# Patient Record
Sex: MALE | Race: WHITE | NOT HISPANIC OR LATINO | URBAN - METROPOLITAN AREA
[De-identification: names, ages, dates, MRNs, and addresses within clinical notes are randomized per-mention and may not be internally consistent; named-entity substitution may affect disease eponyms.]

---

## 2023-10-24 RX ORDER — AMLODIPINE BESYLATE 5 MG/1
5 TABLET ORAL DAILY
Qty: 90 TABLET | Refills: 3 | Status: CANCELLED | OUTPATIENT
Start: 2023-10-24 | End: 2024-10-22

## 2023-10-24 RX ORDER — ATORVASTATIN CALCIUM 80 MG/1
80 TABLET, FILM COATED ORAL NIGHTLY
Qty: 90 TABLET | Refills: 3 | Status: CANCELLED | OUTPATIENT
Start: 2023-10-24 | End: 2024-10-22

## 2023-10-24 RX ORDER — ASPIRIN 81 MG/1
81 TABLET ORAL DAILY
Qty: 90 TABLET | Refills: 3 | Status: CANCELLED | OUTPATIENT
Start: 2023-10-24 | End: 2024-10-22

## 2023-10-24 RX ORDER — METOPROLOL TARTRATE 25 MG/1
25 TABLET, FILM COATED ORAL 2 TIMES DAILY
Qty: 180 TABLET | Refills: 3 | Status: CANCELLED | OUTPATIENT
Start: 2023-10-24 | End: 2024-10-22

## 2023-10-25 RX ORDER — ASPIRIN 81 MG/1
81 TABLET ORAL DAILY
Qty: 90 TABLET | Refills: 3 | Status: CANCELLED | OUTPATIENT
Start: 2023-10-24 | End: 2024-10-22

## 2023-10-25 RX ORDER — AMLODIPINE BESYLATE 5 MG/1
5 TABLET ORAL DAILY
Qty: 90 TABLET | Refills: 3 | Status: CANCELLED | OUTPATIENT
Start: 2023-10-24 | End: 2024-10-22

## 2023-10-25 RX ORDER — METOPROLOL TARTRATE 25 MG/1
25 TABLET, FILM COATED ORAL 2 TIMES DAILY
Qty: 180 TABLET | Refills: 3 | Status: CANCELLED | OUTPATIENT
Start: 2023-10-24 | End: 2024-10-22

## 2023-10-25 RX ORDER — ATORVASTATIN CALCIUM 80 MG/1
80 TABLET, FILM COATED ORAL NIGHTLY
Qty: 90 TABLET | Refills: 3 | Status: CANCELLED | OUTPATIENT
Start: 2023-10-24 | End: 2024-10-22

## 2023-10-30 RX ORDER — METOPROLOL TARTRATE 25 MG/1
25 TABLET, FILM COATED ORAL 2 TIMES DAILY
Qty: 180 TABLET | Refills: 3 | Status: CANCELLED | OUTPATIENT
Start: 2023-10-24 | End: 2024-10-22

## 2023-10-30 RX ORDER — ASPIRIN 81 MG/1
81 TABLET ORAL DAILY
Qty: 90 TABLET | Refills: 3 | Status: CANCELLED | OUTPATIENT
Start: 2023-10-24 | End: 2024-10-22

## 2023-10-30 RX ORDER — AMLODIPINE BESYLATE 5 MG/1
5 TABLET ORAL DAILY
Qty: 90 TABLET | Refills: 3 | Status: CANCELLED | OUTPATIENT
Start: 2023-10-24 | End: 2024-10-22

## 2023-10-30 RX ORDER — ATORVASTATIN CALCIUM 80 MG/1
80 TABLET, FILM COATED ORAL NIGHTLY
Qty: 90 TABLET | Refills: 3 | Status: CANCELLED | OUTPATIENT
Start: 2023-10-24 | End: 2024-10-22

## 2023-11-03 ENCOUNTER — PHARMACY VISIT (OUTPATIENT)
Dept: PHARMACY | Facility: CLINIC | Age: 48
End: 2023-11-03

## 2023-11-03 DIAGNOSIS — I10 HYPERTENSION, UNSPECIFIED TYPE: ICD-10-CM

## 2023-11-03 DIAGNOSIS — E78.5 HYPERLIPIDEMIA, UNSPECIFIED HYPERLIPIDEMIA TYPE: ICD-10-CM

## 2023-11-03 DIAGNOSIS — I25.10 CORONARY ARTERY DISEASE INVOLVING NATIVE CORONARY ARTERY OF NATIVE HEART, UNSPECIFIED WHETHER ANGINA PRESENT: Primary | ICD-10-CM

## 2023-11-03 RX ORDER — METOPROLOL TARTRATE 25 MG/1
25 TABLET, FILM COATED ORAL 2 TIMES DAILY
Qty: 180 TABLET | Refills: 0 | Status: SHIPPED | OUTPATIENT
Start: 2023-11-03 | End: 2023-11-28 | Stop reason: SDUPTHER

## 2023-11-03 RX ORDER — ASPIRIN 81 MG/1
81 TABLET ORAL DAILY
Qty: 90 TABLET | Refills: 0 | Status: SHIPPED | OUTPATIENT
Start: 2023-11-03 | End: 2023-11-28 | Stop reason: SDUPTHER

## 2023-11-03 RX ORDER — ATORVASTATIN CALCIUM 80 MG/1
80 TABLET, FILM COATED ORAL NIGHTLY
Qty: 90 TABLET | Refills: 0 | Status: SHIPPED | OUTPATIENT
Start: 2023-11-03 | End: 2023-11-28 | Stop reason: WASHOUT

## 2023-11-03 RX ORDER — AMLODIPINE BESYLATE 5 MG/1
5 TABLET ORAL DAILY
Qty: 90 TABLET | Refills: 0 | Status: SHIPPED | OUTPATIENT
Start: 2023-11-03 | End: 2023-11-28 | Stop reason: SDUPTHER

## 2023-11-28 ENCOUNTER — PHARMACY VISIT (OUTPATIENT)
Dept: PHARMACY | Facility: CLINIC | Age: 48
End: 2023-11-28

## 2023-11-28 ENCOUNTER — OFFICE VISIT (OUTPATIENT)
Dept: CARDIOLOGY | Facility: CLINIC | Age: 48
End: 2023-11-28

## 2023-11-28 ENCOUNTER — APPOINTMENT (OUTPATIENT)
Dept: CARDIOLOGY | Facility: CLINIC | Age: 48
End: 2023-11-28
Payer: COMMERCIAL

## 2023-11-28 VITALS
BODY MASS INDEX: 26.98 KG/M2 | DIASTOLIC BLOOD PRESSURE: 92 MMHG | HEIGHT: 68 IN | SYSTOLIC BLOOD PRESSURE: 146 MMHG | WEIGHT: 178 LBS | HEART RATE: 62 BPM

## 2023-11-28 DIAGNOSIS — I10 HYPERTENSION, UNSPECIFIED TYPE: ICD-10-CM

## 2023-11-28 DIAGNOSIS — I25.10 CORONARY ARTERY DISEASE INVOLVING NATIVE CORONARY ARTERY OF NATIVE HEART, UNSPECIFIED WHETHER ANGINA PRESENT: ICD-10-CM

## 2023-11-28 DIAGNOSIS — I25.10 CORONARY ARTERY DISEASE INVOLVING NATIVE CORONARY ARTERY OF NATIVE HEART WITHOUT ANGINA PECTORIS: ICD-10-CM

## 2023-11-28 DIAGNOSIS — Z98.61 POST PTCA: Primary | ICD-10-CM

## 2023-11-28 DIAGNOSIS — E78.5 HYPERLIPIDEMIA, UNSPECIFIED HYPERLIPIDEMIA TYPE: ICD-10-CM

## 2023-11-28 DIAGNOSIS — I10 PRIMARY HYPERTENSION: ICD-10-CM

## 2023-11-28 PROBLEM — R10.13 DYSPEPSIA: Status: ACTIVE | Noted: 2023-11-28

## 2023-11-28 PROBLEM — I51.7 MILD CONCENTRIC LEFT VENTRICULAR HYPERTROPHY (LVH): Status: ACTIVE | Noted: 2023-11-28

## 2023-11-28 PROBLEM — R00.2 PALPITATIONS: Status: ACTIVE | Noted: 2023-11-28

## 2023-11-28 PROCEDURE — 93000 ELECTROCARDIOGRAM COMPLETE: CPT | Performed by: INTERNAL MEDICINE

## 2023-11-28 PROCEDURE — 1036F TOBACCO NON-USER: CPT | Performed by: INTERNAL MEDICINE

## 2023-11-28 PROCEDURE — 99214 OFFICE O/P EST MOD 30 MIN: CPT | Performed by: INTERNAL MEDICINE

## 2023-11-28 PROCEDURE — RXMED WILLOW AMBULATORY MEDICATION CHARGE

## 2023-11-28 PROCEDURE — 3080F DIAST BP >= 90 MM HG: CPT | Performed by: INTERNAL MEDICINE

## 2023-11-28 PROCEDURE — 3077F SYST BP >= 140 MM HG: CPT | Performed by: INTERNAL MEDICINE

## 2023-11-28 RX ORDER — ROSUVASTATIN CALCIUM 40 MG/1
40 TABLET, COATED ORAL DAILY
Qty: 90 TABLET | Refills: 3 | Status: SHIPPED | OUTPATIENT
Start: 2023-11-28 | End: 2024-11-27

## 2023-11-28 RX ORDER — AMLODIPINE BESYLATE 5 MG/1
5 TABLET ORAL DAILY
Qty: 90 TABLET | Refills: 0 | Status: SHIPPED | OUTPATIENT
Start: 2023-11-28 | End: 2024-03-04 | Stop reason: SDUPTHER

## 2023-11-28 RX ORDER — METOPROLOL TARTRATE 25 MG/1
25 TABLET, FILM COATED ORAL 2 TIMES DAILY
Qty: 180 TABLET | Refills: 0 | Status: SHIPPED | OUTPATIENT
Start: 2023-11-28 | End: 2024-03-04 | Stop reason: SDUPTHER

## 2023-11-28 RX ORDER — HYDROGEN PEROXIDE 3 %
20 SOLUTION, NON-ORAL MISCELLANEOUS
COMMUNITY

## 2023-11-28 RX ORDER — ASPIRIN 81 MG/1
81 TABLET ORAL DAILY
Qty: 90 TABLET | Refills: 0 | Status: SHIPPED | OUTPATIENT
Start: 2023-11-28 | End: 2024-03-04 | Stop reason: SDUPTHER

## 2023-11-28 RX ORDER — DICLOFENAC SODIUM 75 MG/1
75 TABLET, DELAYED RELEASE ORAL 2 TIMES DAILY
COMMUNITY
Start: 2006-07-21

## 2023-11-28 ASSESSMENT — ENCOUNTER SYMPTOMS
DEPRESSION: 0
LOSS OF SENSATION IN FEET: 0
OCCASIONAL FEELINGS OF UNSTEADINESS: 0

## 2023-11-28 NOTE — ASSESSMENT & PLAN NOTE
patient may have been dehydrated at that time. He does not have asymmetric LVH. Nothing on repeat echo.

## 2023-11-28 NOTE — ASSESSMENT & PLAN NOTE
history of unstable angina, status post PCI to LAD. Now symptom-free. Continue aspirin statins and beta-blockers.

## 2023-11-28 NOTE — PROGRESS NOTES
"Chief Complaint:   Annual Exam (Refills/)     History Of Present Illness:    Kalpesh Montoya is a 47 y.o. male who presented to the hospital in September 2020 with unstable angina type of symptoms. He underwent a CT angiogram which showed a high risk plaque in proximal and mid LAD. He underwent cardiac catheterization and mid LAD lesion was significant by FFR, he underwent PCI.   He is here for follow-up. No recurrence of unstable angina type of symptoms.   No chest pain with activities, no palpitations, lightheadedness or loss of consciousness. Gets rare palpitations that are transient at night.     EKG today shows sinus rhythm. Blood pressure is better controlled, however, he ran out of his medications about a month ago.  His last echo showed mild LVH, there was some LVOT obstruction. There was an echo from 2021 that showed essentially normal heart.     Last Recorded Vitals:  Vitals:    11/28/23 1505   BP: (!) 146/92   BP Location: Left arm   Pulse: 62   Weight: 80.7 kg (178 lb)   Height: 1.727 m (5' 8\")       Past Medical History:  He has no past medical history on file.    Past Surgical History:  He has a past surgical history that includes Other surgical history (09/15/2020); Other surgical history (09/22/2020); Other surgical history (09/22/2020); and CT angio coronary art with heartflow if score >30% (9/9/2020).      Social History:  He reports that he has never smoked. He has never used smokeless tobacco. He reports that he does not drink alcohol and does not use drugs.    Family History:  No family history on file.     Allergies:  Penicillins    Outpatient Medications:  Current Outpatient Medications   Medication Instructions    amLODIPine (NORVASC) 5 mg, oral, Daily    aspirin 81 mg, oral, Daily    atorvastatin (LIPITOR) 80 mg, oral, Nightly    diclofenac (VOLTAREN) 75 mg, oral, 2 times daily    esomeprazole (NEXIUM) 20 mg, oral, Daily before breakfast    metoprolol tartrate (LOPRESSOR) 25 mg, oral, 2 times " daily       Physical Exam:  Physical Exam  Vitals reviewed.   Constitutional:       Appearance: Normal appearance.   Neck:      Vascular: No carotid bruit or JVD.   Cardiovascular:      Rate and Rhythm: Normal rate and regular rhythm.      Heart sounds: Normal heart sounds, S1 normal and S2 normal. No murmur heard.  Pulmonary:      Effort: Pulmonary effort is normal.      Breath sounds: Normal breath sounds.   Abdominal:      General: Abdomen is flat. Bowel sounds are normal.      Palpations: Abdomen is soft.   Musculoskeletal:      Right lower leg: No edema.      Left lower leg: No edema.   Skin:     General: Skin is warm.   Neurological:      Mental Status: He is alert. Mental status is at baseline.   Psychiatric:         Mood and Affect: Mood normal.         Behavior: Behavior normal.           Last Labs:  CBC -  Lab Results   Component Value Date    WBC 5.7 10/15/2022    HGB 14.5 10/15/2022    HCT 41.3 10/15/2022    MCV 95 10/15/2022     10/15/2022       CMP -  Lab Results   Component Value Date    CALCIUM 8.6 10/15/2022    PROT 6.6 09/09/2020    ALBUMIN 4.2 09/09/2020    AST 19 09/09/2020    ALT 23 09/09/2020    ALKPHOS 25 (L) 09/09/2020    BILITOT 0.7 09/09/2020       LIPID PANEL -   Lab Results   Component Value Date    CHOL 133 10/15/2022    TRIG 91 10/15/2022    HDL 33.2 (A) 10/15/2022    CHHDL 4.0 10/15/2022    LDLF 82 10/15/2022    VLDL 18 10/15/2022       RENAL FUNCTION PANEL -   Lab Results   Component Value Date    GLUCOSE 106 (H) 10/15/2022     10/15/2022    K 4.0 10/15/2022     10/15/2022    CO2 28 10/15/2022    ANIONGAP 11 10/15/2022    BUN 14 10/15/2022    CREATININE 1.14 10/15/2022    GFRMALE 79 10/15/2022    CALCIUM 8.6 10/15/2022    ALBUMIN 4.2 09/09/2020        Lab Results   Component Value Date    BNP 11 09/23/2020    HGBA1C 5.6 09/10/2020       Last Cardiology Tests:      Assessment/Plan     In summary Mr. Montoya is a 46-year-old gentleman with coronary artery disease,  hypertension, hyperlipidemia.     1-coronary artery disease-history of unstable angina, status post PCI to LAD. Now symptom-free. Continue aspirin statins and beta-blockers.      0-satmeqcvmqjwmq-MNR was 80 target to lower below 70 if possible.  Switch to Crestor 40 mg repeat lipid profile in 3 months..     3-hypertension-he states his blood pressure is generally well-controlled however he ran out of his medications a month ago.  We will refill. Home/self monitoring of BP encouraged. Goal BP discussed. Lifestyle advise given.       4-mild LVH with some LVOT obstruction- patient may have been dehydrated at that time. He does not have asymmetric LVH. Nothing on repeat echo.     Advised to establish with PCP.     Sahil Lyons MD

## 2023-11-28 NOTE — ASSESSMENT & PLAN NOTE
mild LVH with some LVOT obstruction- patient may have been dehydrated at that time. He does not have asymmetric LVH. Nothing on repeat echo.

## 2023-11-28 NOTE — PATIENT INSTRUCTIONS
Please check your blood pressure at home once a week and keep up record of your home blood pressure numbers.    Hypertension or High blood pressure is a condition that puts you at risk for heart attack, stroke, and kidney disease. It does not usually cause symptoms. But it can be serious.  Generally speaking, your target blood pressure is 130/80 or less. We encourage self-monitoring of blood pressure regularly at home, keeping a log and bringing it with you during doctors' visits.  You have a lot of control over your blood pressure. To lower it:  1) Lose weight (if you are overweight)  2)Choose a diet low in fat and rich in fruits, vegetables, and low-fat dairy products  3) Reduce the amount of salt you eat  4) Do something active for at least 30 minutes a day on most days of the week  5) Cut down on alcohol (if you drink more than 2 alcoholic drinks per day).  I (or your other doctors) may prescribe you medications to lower blood pressure. It is important that you do not stop these medications without speaking with us

## 2024-03-01 DIAGNOSIS — I25.10 CORONARY ARTERY DISEASE INVOLVING NATIVE CORONARY ARTERY OF NATIVE HEART, UNSPECIFIED WHETHER ANGINA PRESENT: ICD-10-CM

## 2024-03-01 DIAGNOSIS — I10 HYPERTENSION, UNSPECIFIED TYPE: ICD-10-CM

## 2024-03-01 PROCEDURE — RXMED WILLOW AMBULATORY MEDICATION CHARGE

## 2024-03-01 RX ORDER — AMLODIPINE BESYLATE 5 MG/1
5 TABLET ORAL DAILY
Qty: 90 TABLET | Refills: 0 | Status: CANCELLED | OUTPATIENT
Start: 2024-03-01 | End: 2024-05-30

## 2024-03-01 RX ORDER — METOPROLOL TARTRATE 25 MG/1
25 TABLET, FILM COATED ORAL 2 TIMES DAILY
Qty: 180 TABLET | Refills: 0 | Status: CANCELLED | OUTPATIENT
Start: 2024-03-01 | End: 2024-05-30

## 2024-03-01 RX ORDER — ASPIRIN 81 MG/1
81 TABLET ORAL DAILY
Qty: 90 TABLET | Refills: 0 | Status: CANCELLED | OUTPATIENT
Start: 2024-03-01 | End: 2024-05-30

## 2024-03-02 ENCOUNTER — PHARMACY VISIT (OUTPATIENT)
Dept: PHARMACY | Facility: CLINIC | Age: 49
End: 2024-03-02
Payer: COMMERCIAL

## 2024-03-02 PROCEDURE — RXMED WILLOW AMBULATORY MEDICATION CHARGE

## 2024-03-04 DIAGNOSIS — I25.10 CORONARY ARTERY DISEASE INVOLVING NATIVE CORONARY ARTERY OF NATIVE HEART, UNSPECIFIED WHETHER ANGINA PRESENT: ICD-10-CM

## 2024-03-04 DIAGNOSIS — I10 HYPERTENSION, UNSPECIFIED TYPE: ICD-10-CM

## 2024-03-04 PROCEDURE — RXMED WILLOW AMBULATORY MEDICATION CHARGE

## 2024-03-04 RX ORDER — METOPROLOL TARTRATE 25 MG/1
25 TABLET, FILM COATED ORAL 2 TIMES DAILY
Qty: 180 TABLET | Refills: 2 | Status: SHIPPED | OUTPATIENT
Start: 2024-03-04 | End: 2024-11-29

## 2024-03-04 RX ORDER — ASPIRIN 81 MG/1
81 TABLET ORAL DAILY
Qty: 90 TABLET | Refills: 0 | Status: SHIPPED | OUTPATIENT
Start: 2024-03-04 | End: 2024-05-31 | Stop reason: SDUPTHER

## 2024-03-04 RX ORDER — AMLODIPINE BESYLATE 5 MG/1
5 TABLET ORAL DAILY
Qty: 90 TABLET | Refills: 2 | Status: SHIPPED | OUTPATIENT
Start: 2024-03-04 | End: 2024-11-29

## 2024-03-05 ENCOUNTER — LAB (OUTPATIENT)
Dept: LAB | Facility: LAB | Age: 49
End: 2024-03-05
Payer: COMMERCIAL

## 2024-03-05 DIAGNOSIS — E78.5 HYPERLIPIDEMIA, UNSPECIFIED HYPERLIPIDEMIA TYPE: ICD-10-CM

## 2024-03-05 DIAGNOSIS — I10 PRIMARY HYPERTENSION: ICD-10-CM

## 2024-03-05 LAB
ANION GAP SERPL CALC-SCNC: 10 MMOL/L (ref 10–20)
BUN SERPL-MCNC: 11 MG/DL (ref 6–23)
CALCIUM SERPL-MCNC: 8.9 MG/DL (ref 8.6–10.3)
CHLORIDE SERPL-SCNC: 104 MMOL/L (ref 98–107)
CHOLEST SERPL-MCNC: 121 MG/DL (ref 0–199)
CHOLESTEROL/HDL RATIO: 3.6
CO2 SERPL-SCNC: 31 MMOL/L (ref 21–32)
CREAT SERPL-MCNC: 1.09 MG/DL (ref 0.5–1.3)
EGFRCR SERPLBLD CKD-EPI 2021: 83 ML/MIN/1.73M*2
ERYTHROCYTE [DISTWIDTH] IN BLOOD BY AUTOMATED COUNT: 12.5 % (ref 11.5–14.5)
GLUCOSE SERPL-MCNC: 115 MG/DL (ref 74–99)
HCT VFR BLD AUTO: 43.1 % (ref 41–52)
HDLC SERPL-MCNC: 33.5 MG/DL
HGB BLD-MCNC: 14.4 G/DL (ref 13.5–17.5)
LDLC SERPL CALC-MCNC: 68 MG/DL
MCH RBC QN AUTO: 32.5 PG (ref 26–34)
MCHC RBC AUTO-ENTMCNC: 33.4 G/DL (ref 32–36)
MCV RBC AUTO: 97 FL (ref 80–100)
NON HDL CHOLESTEROL: 88 MG/DL (ref 0–149)
NRBC BLD-RTO: 0 /100 WBCS (ref 0–0)
PLATELET # BLD AUTO: 165 X10*3/UL (ref 150–450)
POTASSIUM SERPL-SCNC: 3.9 MMOL/L (ref 3.5–5.3)
RBC # BLD AUTO: 4.43 X10*6/UL (ref 4.5–5.9)
SODIUM SERPL-SCNC: 141 MMOL/L (ref 136–145)
TRIGL SERPL-MCNC: 99 MG/DL (ref 0–149)
VLDL: 20 MG/DL (ref 0–40)
WBC # BLD AUTO: 5.7 X10*3/UL (ref 4.4–11.3)

## 2024-03-05 PROCEDURE — 36415 COLL VENOUS BLD VENIPUNCTURE: CPT

## 2024-03-05 PROCEDURE — 80048 BASIC METABOLIC PNL TOTAL CA: CPT

## 2024-03-05 PROCEDURE — 80061 LIPID PANEL: CPT

## 2024-03-05 PROCEDURE — 85027 COMPLETE CBC AUTOMATED: CPT

## 2024-03-06 ENCOUNTER — PHARMACY VISIT (OUTPATIENT)
Dept: PHARMACY | Facility: CLINIC | Age: 49
End: 2024-03-06
Payer: COMMERCIAL

## 2024-03-15 ENCOUNTER — TELEPHONE (OUTPATIENT)
Dept: CARDIOLOGY | Facility: CLINIC | Age: 49
End: 2024-03-15
Payer: COMMERCIAL

## 2024-03-15 NOTE — TELEPHONE ENCOUNTER
Dr. Lyons reviewed your lab results.  No changes to treatment plan at this time.  Follow up as scheduled.  Consider follow up with PCP for diabetes screening if this was fasting blood work.  Glucose was a little elevated for a fasting sample.    Attempted to call results/recommendations but no answer and mailbox is full.

## 2024-03-15 NOTE — TELEPHONE ENCOUNTER
----- Message from Sahil Lyons MD sent at 3/6/2024  2:06 PM EST -----  Results reviewed. No critical results, follow up as usual to discuss in details.

## 2024-05-31 ENCOUNTER — PHARMACY VISIT (OUTPATIENT)
Dept: PHARMACY | Facility: CLINIC | Age: 49
End: 2024-05-31
Payer: COMMERCIAL

## 2024-05-31 DIAGNOSIS — I25.10 CORONARY ARTERY DISEASE INVOLVING NATIVE CORONARY ARTERY OF NATIVE HEART, UNSPECIFIED WHETHER ANGINA PRESENT: ICD-10-CM

## 2024-05-31 PROCEDURE — RXMED WILLOW AMBULATORY MEDICATION CHARGE

## 2024-05-31 RX ORDER — ASPIRIN 81 MG/1
81 TABLET ORAL DAILY
Qty: 90 TABLET | Refills: 0 | Status: CANCELLED | OUTPATIENT
Start: 2024-05-31 | End: 2024-08-29

## 2024-06-01 DIAGNOSIS — I25.10 CORONARY ARTERY DISEASE INVOLVING NATIVE CORONARY ARTERY OF NATIVE HEART, UNSPECIFIED WHETHER ANGINA PRESENT: ICD-10-CM

## 2024-06-03 RX ORDER — ASPIRIN 81 MG/1
81 TABLET ORAL DAILY
Qty: 90 TABLET | Refills: 3 | Status: SHIPPED | OUTPATIENT
Start: 2024-06-03

## 2024-09-03 ENCOUNTER — PHARMACY VISIT (OUTPATIENT)
Dept: PHARMACY | Facility: CLINIC | Age: 49
End: 2024-09-03
Payer: COMMERCIAL

## 2024-09-03 PROCEDURE — RXMED WILLOW AMBULATORY MEDICATION CHARGE

## 2024-10-28 PROBLEM — K30 INDIGESTION: Status: ACTIVE | Noted: 2023-11-28

## 2024-10-28 PROBLEM — I25.10 ARTERIOSCLEROSIS OF CORONARY ARTERY: Status: ACTIVE | Noted: 2022-10-15

## 2024-10-28 PROBLEM — R55 PRE-SYNCOPE: Status: ACTIVE | Noted: 2017-09-23

## 2024-10-28 PROBLEM — R42 DIZZINESS AND GIDDINESS: Status: ACTIVE | Noted: 2017-09-23

## 2024-11-21 ENCOUNTER — OFFICE VISIT (OUTPATIENT)
Dept: CARDIOLOGY | Facility: HOSPITAL | Age: 49
End: 2024-11-21
Payer: COMMERCIAL

## 2024-11-21 VITALS
WEIGHT: 176 LBS | SYSTOLIC BLOOD PRESSURE: 122 MMHG | HEART RATE: 57 BPM | HEIGHT: 68 IN | DIASTOLIC BLOOD PRESSURE: 80 MMHG | BODY MASS INDEX: 26.67 KG/M2

## 2024-11-21 DIAGNOSIS — Z98.61 POST PTCA: ICD-10-CM

## 2024-11-21 DIAGNOSIS — I25.10 ARTERIOSCLEROSIS OF CORONARY ARTERY: Primary | ICD-10-CM

## 2024-11-21 DIAGNOSIS — I10 PRIMARY HYPERTENSION: ICD-10-CM

## 2024-11-21 DIAGNOSIS — I25.10 CORONARY ARTERY DISEASE INVOLVING NATIVE CORONARY ARTERY OF NATIVE HEART WITHOUT ANGINA PECTORIS: ICD-10-CM

## 2024-11-21 DIAGNOSIS — Z98.61 STATUS POST PERCUTANEOUS TRANSLUMINAL CORONARY ANGIOPLASTY: ICD-10-CM

## 2024-11-21 DIAGNOSIS — E78.5 HYPERLIPIDEMIA, UNSPECIFIED HYPERLIPIDEMIA TYPE: ICD-10-CM

## 2024-11-21 DIAGNOSIS — I25.10 CORONARY ARTERY DISEASE INVOLVING NATIVE CORONARY ARTERY OF NATIVE HEART, UNSPECIFIED WHETHER ANGINA PRESENT: ICD-10-CM

## 2024-11-21 DIAGNOSIS — I10 HYPERTENSION, UNSPECIFIED TYPE: ICD-10-CM

## 2024-11-21 PROBLEM — I51.7 MILD LEFT VENTRICULAR HYPERTROPHY: Status: RESOLVED | Noted: 2023-11-28 | Resolved: 2024-11-21

## 2024-11-21 PROCEDURE — 93010 ELECTROCARDIOGRAM REPORT: CPT | Performed by: INTERNAL MEDICINE

## 2024-11-21 PROCEDURE — RXMED WILLOW AMBULATORY MEDICATION CHARGE

## 2024-11-21 PROCEDURE — 3079F DIAST BP 80-89 MM HG: CPT | Performed by: INTERNAL MEDICINE

## 2024-11-21 PROCEDURE — 3008F BODY MASS INDEX DOCD: CPT | Performed by: INTERNAL MEDICINE

## 2024-11-21 PROCEDURE — 1036F TOBACCO NON-USER: CPT | Performed by: INTERNAL MEDICINE

## 2024-11-21 PROCEDURE — 3074F SYST BP LT 130 MM HG: CPT | Performed by: INTERNAL MEDICINE

## 2024-11-21 PROCEDURE — 99214 OFFICE O/P EST MOD 30 MIN: CPT | Performed by: INTERNAL MEDICINE

## 2024-11-21 PROCEDURE — 93005 ELECTROCARDIOGRAM TRACING: CPT | Performed by: INTERNAL MEDICINE

## 2024-11-21 RX ORDER — ROSUVASTATIN CALCIUM 40 MG/1
40 TABLET, COATED ORAL DAILY
Qty: 90 TABLET | Refills: 3 | Status: SHIPPED | OUTPATIENT
Start: 2024-11-21 | End: 2025-11-21

## 2024-11-21 RX ORDER — METOPROLOL TARTRATE 25 MG/1
25 TABLET, FILM COATED ORAL 2 TIMES DAILY
Qty: 180 TABLET | Refills: 2 | Status: SHIPPED | OUTPATIENT
Start: 2024-11-21 | End: 2025-08-18

## 2024-11-21 ASSESSMENT — ENCOUNTER SYMPTOMS
LOSS OF SENSATION IN FEET: 0
DEPRESSION: 0
OCCASIONAL FEELINGS OF UNSTEADINESS: 0

## 2024-11-21 NOTE — PROGRESS NOTES
"Chief Complaint:   Annual Exam (yearly)     History Of Present Illness:    Kalpesh Montoya is a 48 y.o. male presenting for annual follow-up.    He had initially presented to the hospital in September 2020 with unstable angina type of symptoms. He underwent a CT angiogram which showed a high risk plaque in proximal and mid LAD. He underwent cardiac catheterization and mid LAD lesion was significant by FFR, he underwent PCI.   No recurrence of unstable angina type of symptoms.   No chest pain with activities, no palpitations, lightheadedness or loss of consciousness. Gets rare palpitations that are transient at night.     EKG today shows sinus rhythm.   His last echo showed mild LVH, there was some LVOT obstruction. There was an echo from 2021 that showed essentially normal heart.    .     Last Recorded Vitals:  Vitals:    11/21/24 1454   BP: 122/80   BP Location: Right arm   Pulse: 57   Weight: 79.8 kg (176 lb)   Height: 1.715 m (5' 7.5\")       Past Medical History:  He has no past medical history on file.    Past Surgical History:  He has a past surgical history that includes Other surgical history (09/15/2020); Other surgical history (09/22/2020); Other surgical history (09/22/2020); and CT angio coronary art with heartflow if score >30% (9/9/2020).      Social History:  He reports that he has never smoked. He has never used smokeless tobacco. He reports that he does not drink alcohol and does not use drugs.    Family History:  No family history on file.     Allergies:  Penicillins    Outpatient Medications:  Current Outpatient Medications   Medication Instructions    amLODIPine (NORVASC) 5 mg, oral, Daily    aspirin 81 mg, oral, Daily    atorvastatin (Lipitor) 80 mg tablet 1 tablet, Nightly    diclofenac (VOLTAREN) 75 mg, 2 times daily    esomeprazole (NEXIUM) 20 mg, Daily before breakfast    metoprolol tartrate (LOPRESSOR) 25 mg, oral, 2 times daily    rosuvastatin (CRESTOR) 40 mg, oral, Daily    ticagrelor " (BRILINTA) 90 mg, 2 times daily       Physical Exam:  Physical Exam  Vitals reviewed.   Constitutional:       Appearance: Normal appearance.   Neck:      Vascular: No carotid bruit or JVD.   Cardiovascular:      Rate and Rhythm: Normal rate and regular rhythm.      Heart sounds: Normal heart sounds, S1 normal and S2 normal. No murmur heard.  Pulmonary:      Effort: Pulmonary effort is normal.      Breath sounds: Normal breath sounds.   Abdominal:      General: Abdomen is flat. Bowel sounds are normal.      Palpations: Abdomen is soft.   Musculoskeletal:      Right lower leg: No edema.      Left lower leg: No edema.   Skin:     General: Skin is warm.   Neurological:      Mental Status: He is alert. Mental status is at baseline.   Psychiatric:         Mood and Affect: Mood normal.         Behavior: Behavior normal.           Last Labs:  CBC -  Lab Results   Component Value Date    WBC 5.7 03/05/2024    HGB 14.4 03/05/2024    HCT 43.1 03/05/2024    MCV 97 03/05/2024     03/05/2024       CMP -  Lab Results   Component Value Date    CALCIUM 8.9 03/05/2024    PROT 6.6 09/09/2020    ALBUMIN 4.2 09/09/2020    AST 19 09/09/2020    ALT 23 09/09/2020    ALKPHOS 25 (L) 09/09/2020    BILITOT 0.7 09/09/2020       LIPID PANEL -   Lab Results   Component Value Date    CHOL 121 03/05/2024    TRIG 99 03/05/2024    HDL 33.5 03/05/2024    CHHDL 3.6 03/05/2024    LDLF 82 10/15/2022    VLDL 20 03/05/2024    NHDL 88 03/05/2024       RENAL FUNCTION PANEL -   Lab Results   Component Value Date    GLUCOSE 115 (H) 03/05/2024     03/05/2024    K 3.9 03/05/2024     03/05/2024    CO2 31 03/05/2024    ANIONGAP 10 03/05/2024    BUN 11 03/05/2024    CREATININE 1.09 03/05/2024    GFRMALE 79 10/15/2022    CALCIUM 8.9 03/05/2024    ALBUMIN 4.2 09/09/2020        Lab Results   Component Value Date    BNP 11 09/23/2020    HGBA1C 5.6 09/10/2020       Last Cardiology Tests:  ECG:  ECG 12 lead (Clinic Performed) 11/28/2023      Echo:  No  "results found for this or any previous visit from the past 1095 days.      Ejection Fractions:  No results found for: \"EF\"    Cath:  No results found for this or any previous visit from the past 1095 days.      Stress Test:  No results found for this or any previous visit from the past 1095 days.      Cardiac Imaging:  No results found for this or any previous visit from the past 1095 days.          Assessment/Plan   In summary Mr. Montoya is a 48-year-old gentleman with coronary artery disease, hypertension, hyperlipidemia.     1-coronary artery disease-history of unstable angina, status post PCI to LAD. Now symptom-free. Continue aspirin statins and beta-blockers.      9-[id number]-DUV has improved with Crestor.  Will continue.    3-hypertension-his blood pressure is currently well-controlled.  Continue current regimen.     4-mild LVH with some LVOT obstruction- patient may have been dehydrated at that time. He does not have asymmetric LVH. Nothing on repeat echo.     Advised to establish with PCP.       Sahil Lyons MD  "

## 2024-11-30 ENCOUNTER — PHARMACY VISIT (OUTPATIENT)
Dept: PHARMACY | Facility: CLINIC | Age: 49
End: 2024-11-30
Payer: COMMERCIAL

## 2024-11-30 DIAGNOSIS — I10 HYPERTENSION, UNSPECIFIED TYPE: ICD-10-CM

## 2024-11-30 PROCEDURE — RXMED WILLOW AMBULATORY MEDICATION CHARGE

## 2024-11-30 RX ORDER — AMLODIPINE BESYLATE 5 MG/1
5 TABLET ORAL DAILY
Qty: 90 TABLET | Refills: 2 | Status: CANCELLED | OUTPATIENT
Start: 2024-11-30 | End: 2025-08-27

## 2024-12-03 DIAGNOSIS — I10 HYPERTENSION, UNSPECIFIED TYPE: ICD-10-CM

## 2024-12-05 ENCOUNTER — APPOINTMENT (OUTPATIENT)
Dept: CARDIOLOGY | Facility: CLINIC | Age: 49
End: 2024-12-05
Payer: COMMERCIAL

## 2024-12-06 RX ORDER — AMLODIPINE BESYLATE 5 MG/1
5 TABLET ORAL DAILY
Qty: 90 TABLET | Refills: 2 | Status: SHIPPED | OUTPATIENT
Start: 2024-12-06 | End: 2025-09-02

## 2025-02-04 PROCEDURE — RXMED WILLOW AMBULATORY MEDICATION CHARGE

## 2025-02-05 ENCOUNTER — PHARMACY VISIT (OUTPATIENT)
Dept: PHARMACY | Facility: CLINIC | Age: 50
End: 2025-02-05
Payer: COMMERCIAL

## 2025-03-08 ENCOUNTER — PHARMACY VISIT (OUTPATIENT)
Dept: PHARMACY | Facility: CLINIC | Age: 50
End: 2025-03-08

## 2025-03-08 PROCEDURE — RXMED WILLOW AMBULATORY MEDICATION CHARGE

## 2025-05-29 ENCOUNTER — PHARMACY VISIT (OUTPATIENT)
Dept: PHARMACY | Facility: CLINIC | Age: 50
End: 2025-05-29

## 2025-05-29 PROCEDURE — RXMED WILLOW AMBULATORY MEDICATION CHARGE
